# Patient Record
Sex: FEMALE | Race: OTHER | Employment: FULL TIME | URBAN - NONMETROPOLITAN AREA
[De-identification: names, ages, dates, MRNs, and addresses within clinical notes are randomized per-mention and may not be internally consistent; named-entity substitution may affect disease eponyms.]

---

## 2024-07-11 ENCOUNTER — OFFICE VISIT (OUTPATIENT)
Dept: URGENT CARE | Facility: CLINIC | Age: 24
End: 2024-07-11

## 2024-07-11 VITALS
WEIGHT: 130 LBS | HEART RATE: 101 BPM | DIASTOLIC BLOOD PRESSURE: 60 MMHG | OXYGEN SATURATION: 100 % | TEMPERATURE: 98 F | SYSTOLIC BLOOD PRESSURE: 92 MMHG | RESPIRATION RATE: 20 BRPM

## 2024-07-11 DIAGNOSIS — U07.1 COVID-19: Primary | ICD-10-CM

## 2024-07-11 PROCEDURE — 99213 OFFICE O/P EST LOW 20 MIN: CPT

## 2024-07-11 NOTE — PROGRESS NOTES
[facility]        NAME: Filomena Lara is a 23 y.o. female  : 2000    MRN: [id number]  DATE: 2024  TIME: 11:46 AM    Assessment and Plan   COVID-19 [U07.1]  1. COVID-19          Patient presented and stated she took a home COVID test last night and it was positive.  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  She is not currently experiencing any fever, but has sinus congestion, pressure, and cough.  She has been in contact with her OB/GYN regarding her positive result and inquired about medication she may take.  She was instructed by her OB/GYN she may only utilize Tylenol if needed for fever and/or bodyaches.  Reviewed red flag symptoms and when to go to the emergency room and patient verbalized understanding.    Patient Instructions     Vitamin D3 2000 IU daily  Vitamin C 1000mg twice per day  Fluids and rest    Check with your OB-GYN about taking the above medications.     Follow up with PCP in 3-5 days.  Proceed to  ER if symptoms worsen.    If tests are performed, our office will contact you with results only if changes need to made to the care plan discussed with you at the visit. You can review your full results on [facility].    Chief Complaint     Chief Complaint   Patient presents with    Cold Like Symptoms     Cough, sinus pressure and congestion , Home covid test done last night with positive results           History of Present Illness       23-year-old female patient presenting with complaint of cold-like symptoms.  She is complaining of cough, sinus pressure and congestion.  She performed a home COVID test last night and it was positive.      Review of Systems   Review of Systems   Constitutional:  Positive for fatigue. Negative for activity change, appetite change, chills and fever.   HENT:  Positive for congestion, sinus pressure, sinus pain and voice change (hoarse). Negative  for ear discharge, ear pain, sneezing, sore throat and trouble swallowing.    Eyes:  Negative for pain, discharge, redness and itching.   Respiratory:  Positive for cough.    Musculoskeletal:  Negative for arthralgias, myalgias, neck pain and neck stiffness.   Skin:  Negative for color change.   Neurological:  Negative for headaches.   Hematological:  Negative for adenopathy.         Current Medications     No current outpatient medications on file.    Current Allergies     Allergies as of 07/11/2024    (No Known Allergies)            The following portions of the patient's history were reviewed and updated as appropriate: allergies, current medications, past family history, past medical history, past social history, past surgical history and problem list.     History reviewed. No pertinent past medical history.    History reviewed. No pertinent surgical history.    History reviewed. No pertinent family history.      Medications have been verified.        Objective   BP 92/60   Pulse 101   Temp 98 °F (36.7 °C)   Resp 20   Wt 59 kg (130 lb)   SpO2 100%        Physical Exam     Physical Exam  Vitals and nursing note reviewed.   Constitutional:       General: She is not in acute distress.     Appearance: Normal appearance. She is normal weight. She is ill-appearing.   HENT:      Head: Normocephalic and atraumatic.      Right Ear: Tympanic membrane, ear canal and external ear normal.      Left Ear: Tympanic membrane, ear canal and external ear normal.      Nose: Congestion present.      Mouth/Throat:      Lips: Pink.      Mouth: Mucous membranes are moist.      Tongue: No lesions. Tongue does not deviate from midline.      Palate: No mass and lesions.      Pharynx: Oropharynx is clear. Uvula midline. Posterior oropharyngeal erythema present.   Eyes:      Extraocular Movements: Extraocular movements intact.      Conjunctiva/sclera: Conjunctivae normal.      Pupils: Pupils are equal, round, and reactive to light.    Cardiovascular:      Rate and Rhythm: Normal rate and regular rhythm.      Pulses: Normal pulses.      Heart sounds: Normal heart sounds.   Pulmonary:      Effort: Pulmonary effort is normal.      Breath sounds: Normal breath sounds.      Comments: Frequent dry cough  Musculoskeletal:         General: Normal range of motion.      Cervical back: Normal range of motion and neck supple.   Skin:     General: Skin is warm and dry.      Capillary Refill: Capillary refill takes less than 2 seconds.   Neurological:      General: No focal deficit present.      Mental Status: She is alert and oriented to person, place, and time.

## 2024-07-11 NOTE — LETTER
[facility]  [address] [facility] [id number]  No information on file.    July 11, 2024    Patient: Filomena Lara  YOB: 2000    Filomena Lara was seen and evaluated at our [facility]. Please note if Covid and Flu tests are negative, they may return to work when fever free for 24 hours without the use of a fever reducing agent. If Covid or Flu test is positive, they may return to work on 7/13/24, as this is 5 days from the onset of symptoms. Upon return, they must then adhere to strict masking for an additional 5 days.    Sincerely,    OSMAN Hidalgo

## 2024-07-11 NOTE — PATIENT INSTRUCTIONS
Vitamin D3 2000 IU daily  Vitamin C 1000mg twice per day  Fluids and rest    Check with your OB-GYN about taking the above medications.